# Patient Record
Sex: FEMALE | Race: WHITE | NOT HISPANIC OR LATINO | Employment: FULL TIME | URBAN - METROPOLITAN AREA
[De-identification: names, ages, dates, MRNs, and addresses within clinical notes are randomized per-mention and may not be internally consistent; named-entity substitution may affect disease eponyms.]

---

## 2023-02-16 ENCOUNTER — OFFICE VISIT (OUTPATIENT)
Dept: OBSTETRICS AND GYNECOLOGY | Age: 38
End: 2023-02-16
Payer: COMMERCIAL

## 2023-02-16 VITALS
WEIGHT: 178 LBS | DIASTOLIC BLOOD PRESSURE: 72 MMHG | BODY MASS INDEX: 28.61 KG/M2 | HEIGHT: 66 IN | SYSTOLIC BLOOD PRESSURE: 112 MMHG

## 2023-02-16 DIAGNOSIS — O36.80X1 ENCOUNTER TO DETERMINE FETAL VIABILITY OF PREGNANCY, FETUS 1: ICD-10-CM

## 2023-02-16 DIAGNOSIS — Z3A.08 8 WEEKS GESTATION OF PREGNANCY: ICD-10-CM

## 2023-02-16 DIAGNOSIS — Z13.89 SCREENING FOR BLOOD OR PROTEIN IN URINE: Primary | ICD-10-CM

## 2023-02-16 DIAGNOSIS — O09.521 MULTIGRAVIDA OF ADVANCED MATERNAL AGE IN FIRST TRIMESTER: ICD-10-CM

## 2023-02-16 DIAGNOSIS — Z87.51 HISTORY OF PRETERM LABOR: ICD-10-CM

## 2023-02-16 LAB
GLUCOSE UR STRIP-MCNC: NEGATIVE MG/DL
PROT UR STRIP-MCNC: NEGATIVE MG/DL

## 2023-02-16 PROCEDURE — 99203 OFFICE O/P NEW LOW 30 MIN: CPT | Performed by: OBSTETRICS & GYNECOLOGY

## 2023-02-16 PROCEDURE — 76817 TRANSVAGINAL US OBSTETRIC: CPT | Performed by: OBSTETRICS & GYNECOLOGY

## 2023-02-16 RX ORDER — PRENATAL VIT NO.126/IRON/FOLIC 28MG-0.8MG
TABLET ORAL DAILY
COMMUNITY

## 2023-02-16 NOTE — PROGRESS NOTES
"GYN Visit    2023    Patient: Christine Loja          MR#:[id number]      Chief Complaint   Patient presents with   • Gynecologic Exam     new ob- preg confirm LMP 22       History of Present Illness    37 y.o. female  who presents for pregnancy confirmation  New to me, see written gyn  Feels well, no N/V, fatigue only  On PNV     History of PTL and PTD at 35 weeks  3 yo son is autistic  Pap  normal        Patient's last menstrual period was 2022 (exact date).    ________________________________________  There is no problem list on file for this patient.      History reviewed. No pertinent past medical history.    History reviewed. No pertinent surgical history.    Social History     Tobacco Use   Smoking Status Never   Smokeless Tobacco Never       has a current medication list which includes the following prescription(s): prenatal (classic) vitamin.  ________________________________________    Current contraception: none      The following portions of the patient's history were reviewed and updated as appropriate: allergies, current medications, past family history, past medical history, past social history, past surgical history and problem list.    Review of Systems    Pertinent items are noted in HPI.     Objective   Physical Exam    /72   Ht 167.6 cm (66\")   Wt 80.7 kg (178 lb)   LMP 2022 (Exact Date)   BMI 28.73 kg/m²    BP Readings from Last 3 Encounters:   23 112/72      Wt Readings from Last 3 Encounters:   23 80.7 kg (178 lb)      BMI: Estimated body mass index is 28.73 kg/m² as calculated from the following:    Height as of this encounter: 167.6 cm (66\").    Weight as of this encounter: 80.7 kg (178 lb).    Lungs: non labored breathing, no wheezing or tachpnea  Extremities: extremities normal, atraumatic, no cyanosis or edema  Skin: Skin color, texture, turgor normal. No rashes or lesions  Neurologic: Grossly normal  General:   alert, appears stated " age and cooperative   Abdomen: soft, non-tender, without masses or organomegaly            See US- viable IUP 8 weeks                 Assessment:      Diagnoses and all orders for this visit:    1. Screening for blood or protein in urine (Primary)  -     POC Urinalysis Dipstick    2. Encounter to determine fetal viability of pregnancy, fetus 1  -     US Ob Transvaginal    3. 8 weeks gestation of pregnancy  -     OB Panel With HIV  -     Urine Culture - Urine, Urine, Clean Catch  -     Chlamydia / Gonococcus / Mycoplasma Genitalium, COLEMAN, Urine - Urine, Clean Catch    4. History of  labor    5. Multigravida of advanced maternal age in first trimester      Recommend prometrium vaginally at 16- 36 weeks  Recommend ASA at 12 weeks due to AMA  Panorama at fu in 2 weeks

## 2023-02-17 ENCOUNTER — PATIENT ROUNDING (BHMG ONLY) (OUTPATIENT)
Dept: OBSTETRICS AND GYNECOLOGY | Age: 38
End: 2023-02-17
Payer: COMMERCIAL

## 2023-02-17 LAB
ABO GROUP BLD: NORMAL
BASOPHILS # BLD AUTO: 0 X10E3/UL (ref 0–0.2)
BASOPHILS NFR BLD AUTO: 1 %
BLD GP AB SCN SERPL QL: NEGATIVE
EOSINOPHIL # BLD AUTO: 0 X10E3/UL (ref 0–0.4)
EOSINOPHIL NFR BLD AUTO: 0 %
ERYTHROCYTE [DISTWIDTH] IN BLOOD BY AUTOMATED COUNT: 13.8 % (ref 11.7–15.4)
HBV SURFACE AG SERPL QL IA: NEGATIVE
HCT VFR BLD AUTO: 40.2 % (ref 34–46.6)
HCV IGG SERPL QL IA: NON REACTIVE
HGB BLD-MCNC: 13.3 G/DL (ref 11.1–15.9)
HIV 1+2 AB+HIV1 P24 AG SERPL QL IA: NON REACTIVE
IMM GRANULOCYTES # BLD AUTO: 0 X10E3/UL (ref 0–0.1)
IMM GRANULOCYTES NFR BLD AUTO: 1 %
LYMPHOCYTES # BLD AUTO: 1.9 X10E3/UL (ref 0.7–3.1)
LYMPHOCYTES NFR BLD AUTO: 24 %
MCH RBC QN AUTO: 29.3 PG (ref 26.6–33)
MCHC RBC AUTO-ENTMCNC: 33.1 G/DL (ref 31.5–35.7)
MCV RBC AUTO: 89 FL (ref 79–97)
MONOCYTES # BLD AUTO: 0.8 X10E3/UL (ref 0.1–0.9)
MONOCYTES NFR BLD AUTO: 10 %
NEUTROPHILS # BLD AUTO: 4.9 X10E3/UL (ref 1.4–7)
NEUTROPHILS NFR BLD AUTO: 64 %
PLATELET # BLD AUTO: 237 X10E3/UL (ref 150–450)
RBC # BLD AUTO: 4.54 X10E6/UL (ref 3.77–5.28)
RH BLD: POSITIVE
RPR SER QL: NON REACTIVE
RUBV IGG SERPL IA-ACNC: 13.3 INDEX
WBC # BLD AUTO: 7.7 X10E3/UL (ref 3.4–10.8)

## 2023-02-17 NOTE — PROGRESS NOTES
A MY CHART MESSAGE HAS BEEN S[place] TO THE [place]IENT [place] [facility] [place]

## 2023-02-19 LAB
BACTERIA UR CULT: ABNORMAL
BACTERIA UR CULT: ABNORMAL
OTHER ANTIBIOTIC SUSC ISLT: ABNORMAL

## 2023-02-20 RX ORDER — AMOXICILLIN AND CLAVULANATE POTASSIUM 875; 125 MG/1; MG/1
1 TABLET, FILM COATED ORAL EVERY 12 HOURS
Qty: 14 TABLET | Refills: 0 | Status: SHIPPED | OUTPATIENT
Start: 2023-02-20 | End: 2023-02-27

## 2023-02-20 NOTE — PROGRESS NOTES
Notify urine culture is + for multi- drug resistant Klebsiella,  I will send in antibiotic,   Very important to take full course.

## 2023-02-22 LAB
C TRACH RRNA UR QL NAA+PROBE: NEGATIVE
M GENITALIUM DNA UR QL NAA+PROBE: NEGATIVE
N GONORRHOEA RRNA UR QL NAA+PROBE: NEGATIVE